# Patient Record
Sex: FEMALE | Race: BLACK OR AFRICAN AMERICAN
[De-identification: names, ages, dates, MRNs, and addresses within clinical notes are randomized per-mention and may not be internally consistent; named-entity substitution may affect disease eponyms.]

---

## 2021-06-10 ENCOUNTER — HOSPITAL ENCOUNTER (OUTPATIENT)
Dept: HOSPITAL 53 - M PLALAB | Age: 31
End: 2021-06-10
Attending: ADVANCED PRACTICE MIDWIFE
Payer: COMMERCIAL

## 2021-06-10 DIAGNOSIS — O23.599: Primary | ICD-10-CM

## 2021-06-10 DIAGNOSIS — Z3A.00: ICD-10-CM

## 2021-06-10 PROCEDURE — 84702 CHORIONIC GONADOTROPIN TEST: CPT

## 2021-06-10 PROCEDURE — 36415 COLL VENOUS BLD VENIPUNCTURE: CPT

## 2021-07-02 ENCOUNTER — HOSPITAL ENCOUNTER (OUTPATIENT)
Dept: HOSPITAL 53 - M PLALAB | Age: 31
End: 2021-07-02
Attending: ADVANCED PRACTICE MIDWIFE
Payer: COMMERCIAL

## 2021-07-02 DIAGNOSIS — O99.511: Primary | ICD-10-CM

## 2021-07-02 LAB
CREAT UR-MCNC: 124 MG/DL
GLUCOSE 1H P 50 G GLC PO SERPL-MCNC: 119 MG/DL (ref ?–140)
HCT VFR BLD AUTO: 38.5 % (ref 36–47)
HCV AB SER QL: 0.1 INDEX (ref ?–0.8)
HGB BLD-MCNC: 11.9 G/DL (ref 12–15.5)
HIV 1+2 AB+HIV1 P24 AG SERPL QL IA: NEGATIVE
MCH RBC QN AUTO: 27 PG (ref 27–33)
MCHC RBC AUTO-ENTMCNC: 30.9 G/DL (ref 32–36.5)
MCV RBC AUTO: 87.5 FL (ref 80–96)
PLATELET # BLD AUTO: 420 10^3/UL (ref 150–450)
PROT UR-MCNC: 9.7 MG/DL (ref 0–12)
RBC # BLD AUTO: 4.4 10^6/UL (ref 4–5.4)
WBC # BLD AUTO: 7.5 10^3/UL (ref 4–10)

## 2021-07-03 LAB — N GONORRHOEA RRNA SPEC QL NAA+PROBE: NEGATIVE

## 2021-07-13 ENCOUNTER — HOSPITAL ENCOUNTER (OUTPATIENT)
Dept: HOSPITAL 53 - M PLALAB | Age: 31
End: 2021-07-13
Attending: ADVANCED PRACTICE MIDWIFE
Payer: COMMERCIAL

## 2021-07-13 DIAGNOSIS — O36.1910: Primary | ICD-10-CM

## 2021-07-23 ENCOUNTER — HOSPITAL ENCOUNTER (OUTPATIENT)
Dept: HOSPITAL 53 - M SFHCWAGY | Age: 31
End: 2021-07-23
Attending: ADVANCED PRACTICE MIDWIFE
Payer: COMMERCIAL

## 2021-07-23 DIAGNOSIS — O36.1910: Primary | ICD-10-CM

## 2021-07-23 PROCEDURE — 87086 URINE CULTURE/COLONY COUNT: CPT

## 2021-09-21 ENCOUNTER — HOSPITAL ENCOUNTER (OUTPATIENT)
Dept: HOSPITAL 53 - M WHC | Age: 31
End: 2021-09-21
Attending: OBSTETRICS & GYNECOLOGY
Payer: COMMERCIAL

## 2021-09-21 DIAGNOSIS — Z34.82: Primary | ICD-10-CM

## 2021-09-21 DIAGNOSIS — Z3A.22: ICD-10-CM

## 2021-09-21 DIAGNOSIS — Z36.89: ICD-10-CM

## 2021-09-21 NOTE — REP
INDICATION:

ANATOMY.



COMPARISON:

None.



TECHNIQUE:

Transabdominal obstetric sonography.



FINDINGS:

Scanning through the gravid uterus demonstrates a viable single intrauterine gestation

in transverse fetal lie.  Fetal motion is observed and fetal heart rate is recorded at

142 beats per minute.  A posterior placenta is seen, grade 1, without evidence of

placenta previa. Closed cervical length is measured at 4.5 cm transabdominally.  No

extrauterine abnormality is observed.  Amniotic fluid is subjectively normal.



The following fetal anatomic structures are less than optimally identified due to

fetal position: Face and profile nose and lips, four-chamber heart with left and right

ventricular outflow tract views, and spine.  The following fetal anatomic structures

are identified felt to be unremarkable: Fetal cranium and intracranial contents,

lungs, diaphragm, left-sided stomach, abdominal wall cord insertion, right and left

kidney, urinary bladder, upper and lower extremities, three-vessel cord.



Biometry chart:

BPD 5.0 cm, 21 weeks 2 days

Head circumference 19.2 cm, 21 weeks 3 days

Abdominal circumference 18.1 cm, 22 weeks 6 days

Femur length 3.8 cm, 22 weeks 0 days

Humeral length 3.6 cm, 22 weeks 4 days

HC AC ratio normal 1.06

Cephalic index normal 0.72

Estimated fetal weight 495 g, 1 lb 1 oz, 95th percentile for 21 weeks 1 day



IMPRESSION:

Viable single intrauterine gestation at 22 weeks 0 days by today's composite

sonographic criteria.  DANNY by today's sonography January 25, 2022.  No complication

identified.



Expected gestational age estimate from known DANNY, 31 January 2022 is 21 weeks 1 day.

Fetal anatomic survey less than complete as above..





<Electronically signed by Jaydon Gaona > 09/21/21 2109